# Patient Record
Sex: FEMALE | Race: WHITE | NOT HISPANIC OR LATINO | Employment: OTHER | URBAN - METROPOLITAN AREA
[De-identification: names, ages, dates, MRNs, and addresses within clinical notes are randomized per-mention and may not be internally consistent; named-entity substitution may affect disease eponyms.]

---

## 2022-06-28 RX ORDER — ALPRAZOLAM 0.5 MG/1
TABLET ORAL
COMMUNITY

## 2022-06-28 RX ORDER — LEVOTHYROXINE SODIUM 0.15 MG/1
TABLET ORAL
COMMUNITY

## 2022-06-28 RX ORDER — LORATADINE 10 MG/1
TABLET ORAL
COMMUNITY

## 2022-10-04 ENCOUNTER — HOSPITAL ENCOUNTER (OUTPATIENT)
Dept: RADIOLOGY | Facility: MEDICAL CENTER | Age: 87
End: 2022-10-04
Payer: MEDICARE

## 2022-10-04 ENCOUNTER — PATIENT OUTREACH (OUTPATIENT)
Dept: ONCOLOGY | Facility: MEDICAL CENTER | Age: 87
End: 2022-10-04
Payer: MEDICARE

## 2022-10-04 NOTE — PROGRESS NOTES
Oncology Nurse Navigator (ONN) Kenia Birch reached out to patient to follow up on new referral.  No answer.  ONN left voice message with my contact information requesting a call back at patient's convenience.    INTERVENTION:  ONN introduction letter &  [facility] Calendar sent to patient today.  Patient enrolled in [facility].

## 2022-10-05 ENCOUNTER — HOSPITAL ENCOUNTER (OUTPATIENT)
Dept: RADIATION ONCOLOGY | Facility: MEDICAL CENTER | Age: 87
End: 2022-10-31
Attending: RADIOLOGY
Payer: MEDICARE

## 2022-10-05 VITALS
OXYGEN SATURATION: 98 % | BODY MASS INDEX: 23.56 KG/M2 | HEIGHT: 64 IN | TEMPERATURE: 97.7 F | HEART RATE: 79 BPM | RESPIRATION RATE: 20 BRPM | WEIGHT: 138 LBS | SYSTOLIC BLOOD PRESSURE: 193 MMHG | DIASTOLIC BLOOD PRESSURE: 98 MMHG

## 2022-10-05 DIAGNOSIS — C14.8 CANCER OF LIP AND ORAL CAVITY (HCC): ICD-10-CM

## 2022-10-05 PROCEDURE — 99214 OFFICE O/P EST MOD 30 MIN: CPT | Mod: 25 | Performed by: RADIOLOGY

## 2022-10-05 PROCEDURE — 99205 OFFICE O/P NEW HI 60 MIN: CPT | Mod: 25 | Performed by: RADIOLOGY

## 2022-10-05 PROCEDURE — 31575 DIAGNOSTIC LARYNGOSCOPY: CPT | Performed by: RADIOLOGY

## 2022-10-05 RX ORDER — LOSARTAN POTASSIUM 25 MG/1
25 TABLET ORAL DAILY
COMMUNITY

## 2022-10-05 RX ORDER — LEVOTHYROXINE SODIUM 0.15 MG/1
150 TABLET ORAL
COMMUNITY

## 2022-10-05 RX ORDER — ALPRAZOLAM 0.5 MG/1
0.5 TABLET ORAL NIGHTLY PRN
COMMUNITY
End: 2022-10-07 | Stop reason: SDUPTHER

## 2022-10-05 ASSESSMENT — PAIN SCALES - GENERAL: PAINLEVEL: 5=MODERATE PAIN

## 2022-10-05 NOTE — CONSULTS
RADIATION ONCOLOGY CONSULT    Patient name:  Jennyfer Jordan    Primary Physician:  No primary care provider on file. MRN: [id number]  CSN: [id number]   Referring physician:  Mario Prasad M.D.  : 1931, 89 y.o.     DATE OF SERVICE:   10/5/2022    IDENTIFICATION:   A 89 y.o. female with  Visit Diagnoses     ICD-10-CM   1. 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 on 10/5/2022  Histopathologic type: Squamous cell carcinoma, NOS  Stage prefix: Initial diagnosis  Histologic grade (G): G2  Histologic grading system: 3 grade system      She is being seen in consultation at the kind request of Mario Morales M.D.     HISTORY OF PRESENT ILLNESS:   Subjective    89-year-old relatively healthy female appearing much younger than stated age.  She presents with an enlarging right submandibular mass.  She was noted to have a mucosal lesion of the right lower lip.  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  Surgical options discussed with patient.  She refused.  She refused chemotherapy.  Has been referred for radiotherapy discussion possibly palliative quad shot therapy.        PAST MEDICAL HISTORY:   Past Medical History:   Diagnosis Date    Anxiety     Basal cell carcinoma (BCC)     L eyelid    Cataract     Graves disease     [place] (hard of hearing)     Hypertension     Thyroid disease        PAST SURGICAL HISTORY:  Past Surgical History:   Procedure Laterality Date    THYROIDECTOMY         CURRENT MEDICATIONS:  Current Outpatient Medications   Medication Sig Dispense Refill    losartan (COZAAR) 25 MG Tab Take 25 mg by mouth every day.      levothyroxine (SYNTHROID) 150 MCG Tab Take 150 mcg by mouth every morning on an empty stomach.      ALPRAZolam (XANAX) 0.5 MG Tab Take 0.5 mg by  "mouth at bedtime as needed for Sleep.       No current facility-administered medications for this encounter.       ALLERGIES:  Asa [aspirin], Erythromycin, Latex, and Penicillins    FAMILY HISTORY:    family history includes Cancer in her brother.    SOCIAL HISTORY:     reports that she has never smoked. She has never used smokeless tobacco. She reports current alcohol use of about 0.6 oz per week. She reports current drug use. Drug: Oral.  Patient currently resides in [place] with her son and DIL. She is retired.    REVIEW OF SYSTEMS:    A complete review of system taken. Pertinent items in HPI.  All others negative.    PHYSICAL EXAM:   PERFORMANCE STATUS:  ECOG Performance Review 10/5/2022   ECOG Performance Status Restricted in physically strenuous activity but ambulatory and able to carry out work of a light or sedentary nature, e.g., light house work, office work     Karnofsky Score 10/5/2022   Karnofsky Score 90     BP (!) 193/98   Pulse 79   Temp 36.5 °C (97.7 °F)   Resp 20   Ht 1.626 m (5' 4\")   Wt 62.6 kg (138 lb)   SpO2 98%   BMI 23.69 kg/m²   Physical Exam  Vitals and nursing note reviewed.   Constitutional:       General: She is not in acute distress.     Appearance: She is well-developed.   HENT:      Head: Normocephalic.      Mouth/Throat:      Mouth: Mucous membranes are moist.      Pharynx: Oropharynx is clear.      Comments: 1 cm firm indurated mass with central shallow ulcer involving the  mucosal surface of the right lower lip  Eyes:      Conjunctiva/sclera: Conjunctivae normal.      Pupils: Pupils are equal, round, and reactive to light.   Cardiovascular:      Rate and Rhythm: Normal rate.   Pulmonary:      Effort: Pulmonary effort is normal.   Musculoskeletal:         General: Normal range of motion.      Cervical back: Normal range of motion.   Lymphadenopathy:      Cervical: Cervical adenopathy (5cm fixed right submandibular mass) present.   Skin:     General: Skin is warm and dry.      " Findings: No erythema.   Neurological:      Mental Status: She is alert and oriented to person, place, and time.   Psychiatric:         Behavior: Behavior normal.         Thought Content: Thought content normal.         Judgment: Judgment normal.           FLEXIBLE FIBEROPTIC EXAM:  Normal exam      LABORATORY DATA:   Lab Results   Component Value Date/Time    RBC 4.06 01/15/2021 08:57 AM    HEMOGLOBIN 13.4 01/15/2021 08:57 AM    HEMATOCRIT 39.6 01/15/2021 08:57 AM    MCV 97.6 01/15/2021 08:57 AM    MCH 33.1 01/15/2021 08:57 AM    MCHC 33.9 01/15/2021 08:57 AM    RDW 13.3 01/15/2021 08:57 AM    PLATELETCT 276 01/15/2021 08:57 AM    MPV 7 (L) 01/15/2021 08:57 AM    NEUTSPOLYS 60 01/15/2021 08:57 AM    LYMPHOCYTES 26 01/15/2021 08:57 AM    MONOCYTES 9 01/15/2021 08:57 AM    EOSINOPHILS 5 01/15/2021 08:57 AM    BASOPHILS 1 01/15/2021 08:57 AM      No results found for: SODIUM, POTASSIUM, CHLORIDE, CO2, GLUCOSE, BUN, CREATININE, BUNCREATRAT, GLOMRATE        RADIOLOGY DATA:  DX-CHEST-LIMITED (1 VIEW)    Result Date: 9/13/2022  NEGATIVE EXAM.    CT Soft Tissue Neck with Contrast    Result Date: 9/13/2022  Lobular inflamed enhancing structures present in submandibular region appear to be lymph nodes but could be associated with a mass as well, apparently separate from adjacent submandibular gland..  Findings have progressed somewhat since previous scan and may.      IMPRESSION:    A 89 y.o. with  Visit Diagnoses     ICD-10-CM   1. 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 on 10/5/2022  Histopathologic type: Squamous cell carcinoma, NOS  Stage prefix: Initial diagnosis  Histologic grade (G): G2  Histologic grading system: 3 grade system        RECOMMENDATIONS:   Local regionally advanced lip cancer in a surprisingly healthy 89-year-old.   Recommended PET/CT for staging.  If there is no evidence of metastatic disease, I strongly urged them to consider definitive surgical resection followed by possible adjuvant radiotherapy for curative attempt.  Surgery can be performed at [facility] and I will be happy to make the referral.  From my understanding this is also been recommended by Dr. Prasad.    If on the other hand there is evidence of metastatic disease then palliative therapy would be reasonable choice.    She appears to be receptive to this idea.  PET/CT has been scheduled for tomorrow with a follow-up day after to review findings and make additional plans.    Thank you the opportunity to participate in her care.  If there are any questions or comments please do not hesitate calling.    Mathew BABIN M.D.  Electronically signed by: Mathew BABIN M.D., 10/5/2022 5:25 PM  [phone]      Orders Placed This Encounter    [id number] BASE TO MID-THIGH    Referral to Oncology Psychosocial Screening for Distress    REFERRAL TO ONCOLOGY NURSE NAVIGATOR    losartan (COZAAR) 25 MG Tab    levothyroxine (SYNTHROID) 150 MCG Tab    ALPRAZolam (XANAX) 0.5 MG Tab

## 2022-10-05 NOTE — PROGRESS NOTES
"Patient was seen today in clinic with Dr. Sanders for consult.  Vitals signs and weight were obtained and pain assessment was completed.  Allergies and medications were reviewed with the patient.       Vitals/Pain:  Vitals:    10/05/22 0937   BP: (!) 193/98   Pulse: 79   Resp: 20   Temp: 36.5 °C (97.7 °F)   SpO2: 98%   Weight: 62.6 kg (138 lb)   Height: 1.626 m (5' 4\")   Pain Score: 5=Moderate Pain        Allergies:   Asa [aspirin], Erythromycin, Latex, and Penicillins    Current Medications:  Current Outpatient Medications   Medication Sig Dispense Refill    losartan (COZAAR) 25 MG Tab Take 25 mg by mouth every day.      levothyroxine (SYNTHROID) 150 MCG Tab Take 150 mcg by mouth every morning on an empty stomach.      ALPRAZolam (XANAX) 0.5 MG Tab Take 0.5 mg by mouth at bedtime as needed for Sleep.       No current facility-administered medications for this encounter.         PCP:  No primary care provider on file.        Carol Ramos R.N.   "

## 2022-10-06 ENCOUNTER — HOSPITAL ENCOUNTER (OUTPATIENT)
Dept: RADIOLOGY | Facility: MEDICAL CENTER | Age: 87
End: 2022-10-06
Attending: RADIOLOGY
Payer: MEDICARE

## 2022-10-06 DIAGNOSIS — C14.8 CANCER OF LIP AND ORAL CAVITY (HCC): ICD-10-CM

## 2022-10-06 PROCEDURE — A9552 F18 FDG: HCPCS

## 2022-10-06 NOTE — PROCEDURES
DATE OF SERVICE: 10/5/2022         FIBEROPTIC NASO-PHARYNGOSCOPY NOTE      Flexible fiberoptic exam was performed after anesthesia of left nostril and oropharynx.    Nasopharynx:       R. Eustachian canal opening, torus, fossa of Rosenmuller appear normal  L. Eustachian canal opening, torus, fossa of Rosenmuller appear normal      Oropharynx:        Base of tongue normal appearing.  Vallecula normal appearing.  Epiglottis normal appearing.  PE folds normal appearing.    Larynx:      R. AE fold, false vocal fold, arytenoid appear normal  L. AE fold, false vocal fold, arytenoid appear normal  R. Cord mobile  L. Cord mobile   R. Piriform sinus appears normal  L. Piriform sinus appears normal      Mathew BABIN M.D.  Electronically signed by: Mathew BABIN M.D., 10/5/2022 5:25 PM  [phone]

## 2022-10-07 ENCOUNTER — PATIENT OUTREACH (OUTPATIENT)
Dept: ONCOLOGY | Facility: MEDICAL CENTER | Age: 87
End: 2022-10-07
Payer: MEDICARE

## 2022-10-07 VITALS
WEIGHT: 139.99 LBS | SYSTOLIC BLOOD PRESSURE: 195 MMHG | HEART RATE: 71 BPM | OXYGEN SATURATION: 98 % | DIASTOLIC BLOOD PRESSURE: 86 MMHG | BODY MASS INDEX: 24.03 KG/M2

## 2022-10-07 DIAGNOSIS — C14.8 CANCER OF LIP AND ORAL CAVITY (HCC): ICD-10-CM

## 2022-10-07 DIAGNOSIS — F51.01 PRIMARY INSOMNIA: ICD-10-CM

## 2022-10-07 PROCEDURE — 99213 OFFICE O/P EST LOW 20 MIN: CPT | Performed by: RADIOLOGY

## 2022-10-07 PROCEDURE — 99212 OFFICE O/P EST SF 10 MIN: CPT | Performed by: RADIOLOGY

## 2022-10-07 RX ORDER — ALPRAZOLAM 0.5 MG/1
0.5 TABLET ORAL NIGHTLY PRN
Qty: 30 TABLET | Refills: 0 | Status: SHIPPED | OUTPATIENT
Start: 2022-10-07 | End: 2022-11-06

## 2022-10-07 ASSESSMENT — PAIN SCALES - GENERAL: PAINLEVEL: NO PAIN

## 2022-10-07 NOTE — PROGRESS NOTES
RADIATION ONCOLOGY FOLLOW-UP    Patient name:  Jennyfer Jordan    Primary Physician:  Pcp Pt States None MRN: [id number]  [facility]: [id number]   Referring physician:  Mario Prasad M.D.  : 1931, 89 y.o.     DATE OF SERVICE:   10/7/2022    IDENTIFICATION:   A 89 y.o. 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 on 10/5/2022  Histopathologic type: Squamous cell carcinoma, NOS  Stage prefix: Initial diagnosis  Histologic grade (G): G2  Histologic grading system: 3 grade system        HISTORY OF PRESENT ILLNESS:   Subjective    89-year-old relatively healthy female appearing much younger than stated age.  She presents with an enlarging right submandibular mass.  She was noted to have a mucosal lesion of the right lower lip.  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  Surgical options discussed with patient.  She refused.  She refused chemotherapy.  Has been referred for radiotherapy discussion possibly palliative quad shot therapy.        INTERVAL HISTORY:  10/7/2022.  Follow-up visit after staging PET CT scan.  PET/CT shows disease limited to the right lip and right neck level 1 and 2.      CURRENT MEDICATIONS:  Current Outpatient Medications   Medication Sig Dispense Refill    losartan (COZAAR) 25 MG Tab Take 25 mg by mouth every day.      levothyroxine (SYNTHROID) 150 MCG Tab Take 150 mcg by mouth every morning on an empty stomach.      ALPRAZolam (XANAX) 0.5 MG Tab Take 0.5 mg by mouth at bedtime as needed for Sleep.       No current facility-administered medications for this encounter.       ALLERGIES:  Asa [aspirin], Erythromycin, Latex, and Penicillins    REVIEW OF SYSTEMS:    A complete review of system taken. Pertinent items in HPI.  All other negative.    PHYSICAL EXAM:   PERFORMANCE STATUS:  Karnofsky Score  "10/5/2022   Karnofsky Score 90     Vitals 10/5/2022   SYSTOLIC 193   DIASTOLIC 98   PULSE 79   TEMPERATURE 97.7   RESPIRATIONS 20   WEIGHT 138   HEIGHT 5' 4\"   BMI 23.69 kg/m2   SPO2 98       Physical Exam  Vitals and nursing note reviewed.   Constitutional:       Appearance: She is well-developed.   HENT:      Head: Normocephalic.   Skin:     General: Skin is warm and dry.      Findings: No erythema.   Neurological:      Mental Status: She is alert and oriented to person, place, and time.   Psychiatric:         Behavior: Behavior normal.         Thought Content: Thought content normal.         Judgment: Judgment normal.         FIBEROPTIC EXAM:  Not performed    LABORATORY DATA:   Lab Results   Component Value Date/Time    RBC 4.06 01/15/2021 08:57 AM    HEMOGLOBIN 13.4 01/15/2021 08:57 AM    HEMATOCRIT 39.6 01/15/2021 08:57 AM    MCV 97.6 01/15/2021 08:57 AM    MCH 33.1 01/15/2021 08:57 AM    MCHC 33.9 01/15/2021 08:57 AM    RDW 13.3 01/15/2021 08:57 AM    PLATELETCT 276 01/15/2021 08:57 AM    MPV 7 (L) 01/15/2021 08:57 AM    NEUTSPOLYS 60 01/15/2021 08:57 AM    LYMPHOCYTES 26 01/15/2021 08:57 AM    MONOCYTES 9 01/15/2021 08:57 AM    EOSINOPHILS 5 01/15/2021 08:57 AM    BASOPHILS 1 01/15/2021 08:57 AM      No results found for: SODIUM, POTASSIUM, CHLORIDE, CO2, GLUCOSE, BUN, CREATININE, BUNCREATRAT, GLOMRATE      RADIOLOGY DATA:  DX-CHEST-LIMITED (1 VIEW)    Result Date: 9/13/2022  NEGATIVE EXAM.    [id number] BASE TO MID-THIGH    Result Date: 10/6/2022  1.  Focal hypermetabolic lesion in the paramedian RIGHT lower lip consistent with known squamous cell carcinoma. 2.  Metastatic involvement of multiple RIGHT submandibular lymph nodes. 3.  Probable metastatic involvement of normal-sized RIGHT lower cervical lymph node (level 3). 4.  Potential involvement of LEFT submandibular lymph node, without CT correlate. 5.  No evidence for metastatic disease in the chest, abdomen or pelvis.    CT Soft Tissue Neck with " Contrast    Result Date: 9/13/2022  Lobular inflamed enhancing structures present in submandibular region appear to be lymph nodes but could be associated with a mass as well, apparently separate from adjacent submandibular gland..  Findings have progressed somewhat since previous scan and may.      IMPRESSION:    A 89 y.o. 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 on 10/5/2022  Histopathologic type: Squamous cell carcinoma, NOS  Stage prefix: Initial diagnosis  Histologic grade (G): G2  Histologic grading system: 3 grade system        RECOMMENDATIONS:   Reviewed imaging with patient and her son.  She has local regional disease only and no evidence of distant metastatic disease.  She does have an appointment with Dr. Del Valle at [facility] for surgical discussion on October 20.  I strongly encouraged her if she is a candidate for surgery to have surgery followed by adjuvant radiotherapy.    She will return here for follow-up November 22, assuming surgery will be performed the first week of November.      Thank you for the opportunity to participate in her care.  If any questions or comments, please do not hesitate in calling.      No orders of the defined types were placed in this encounter.

## 2022-10-07 NOTE — PROGRESS NOTES
ALVIN mailed introduction letter and contact information.  Patient has a pet on 10/07 then a follow up on the same day with Dr. Sanders.

## 2022-10-10 ENCOUNTER — PATIENT OUTREACH (OUTPATIENT)
Dept: ONCOLOGY | Facility: MEDICAL CENTER | Age: 87
End: 2022-10-10
Payer: MEDICARE

## 2022-10-14 ENCOUNTER — PATIENT OUTREACH (OUTPATIENT)
Dept: ONCOLOGY | Facility: MEDICAL CENTER | Age: 87
End: 2022-10-14
Payer: MEDICARE

## 2022-10-14 NOTE — PROGRESS NOTES
Oncology Nurse Navigation Assessment:  Oncology Nurse Navigator (ONN) Kenia Birch received a call back from patient's son, Eric.  ALVIN Birch introduced myself, my role and the resources at [facility]. ALVIN explained I am here to provide support, education and guidance throughout his Mom's healthcare journey and to assist with any barriers to care.  Patient 's son shared his understanding of the diagnosis and plan of care for his Mom.  He shared they have an appointment 10/20/22 at [facility] with Dr. Ever Del Valle, an ENT surgeon.  He shared they were referred by Dr. Sanders & their [place] ENT physician.  ONN reviewed the patient's plan of care based on physician notes and scheduled appointments.  Patient verbalized understanding of information provided.  Patient's support team is her son & daughter-in-law.  They live together here in [place]. Eric's current questions have been answered & he agrees to receive my introduction letter & [facility] calendar of Support Services via [employer] & share information with his Mom.  ALVIN Birch encouraged call back with any questions or concerns.  Eric shared they will call with an update after the [facility] consult.      BARRIERS ASSESSMENT:  TRANSPORTATION:  No barriers. Son & daughter-in-law drive patient to appointments.  EMPLOYMENT:   Retired.  FINANCIAL:  No barriers stated.  INSURANCE:  Medicare A/B & AARP.  SUPPORT SYSTEM:  Son & daughter-in-law.  PSYCHOLOGICAL:  Unable to speak with patient today, will complete distress assessment when patient is available.  COMMUNICATION:   No barriers noted.   FAMILY CARE:   Patient is not a caregiver.  SELF CARE:  Independent, lives with son & daughter-in-law, does not drive.         INTERVENTION:  ALVIN introduction letter & [facility] Cancer Support Services Calendar sent to patient today.

## 2022-11-03 ENCOUNTER — PATIENT OUTREACH (OUTPATIENT)
Dept: ONCOLOGY | Facility: MEDICAL CENTER | Age: 87
End: 2022-11-03
Payer: MEDICARE

## 2022-11-03 NOTE — PROGRESS NOTES
"On November 3rd, 2022, Oncology Social Worker Flora Uriarte contacted pt. via telephone.  OSW Chapito explained her role and reason for her call to pt.  Pt. shared she has decided not to have surgery in [place].  Pt. shared she has an appointment with Dr. Sanders on November 11th, 2022 at 9:30 am.  Pt. shared her daughter called to get this appointment scheduled sooner since she's not having surgery.  Pt. shared she decided not to have surgery because they told her \"a side of my face will be paralyzed.\"      Pt. shared her son is going to take time off when she has treatment to take care of her.  Pt. asked about her son getting paid for taking care of her.  ALIZE Uriarte informed pt. she would need to contact the social security administration to find out if medicare will pay for caregiving.      ALIZE Uriarte informed pt. she would contact Dr. Sanders's Medical Assistant at pt.'s request to find out if they can get her in to see Dr. Sanders sooner.      ALIZE Uriarte contacted Dr. Sanders's MA Katiana Billy to relay pt.'s message.    "

## 2022-11-11 ENCOUNTER — HOSPITAL ENCOUNTER (OUTPATIENT)
Dept: RADIATION ONCOLOGY | Facility: MEDICAL CENTER | Age: 87
End: 2022-11-30
Attending: RADIOLOGY
Payer: MEDICARE

## 2022-11-11 ENCOUNTER — PHARMACY VISIT (OUTPATIENT)
Dept: PHARMACY | Facility: MEDICAL CENTER | Age: 87
End: 2022-11-11
Payer: COMMERCIAL

## 2022-11-11 ENCOUNTER — HOSPITAL ENCOUNTER (OUTPATIENT)
Dept: RADIATION ONCOLOGY | Facility: MEDICAL CENTER | Age: 87
End: 2022-11-11

## 2022-11-11 VITALS
TEMPERATURE: 97.6 F | OXYGEN SATURATION: 98 % | BODY MASS INDEX: 23.95 KG/M2 | HEART RATE: 77 BPM | DIASTOLIC BLOOD PRESSURE: 96 MMHG | WEIGHT: 139.55 LBS | SYSTOLIC BLOOD PRESSURE: 172 MMHG

## 2022-11-11 DIAGNOSIS — C14.8 CANCER OF LIP AND ORAL CAVITY (HCC): ICD-10-CM

## 2022-11-11 PROCEDURE — 77290 THER RAD SIMULAJ FIELD CPLX: CPT | Performed by: RADIOLOGY

## 2022-11-11 PROCEDURE — 99212 OFFICE O/P EST SF 10 MIN: CPT | Mod: 25 | Performed by: RADIOLOGY

## 2022-11-11 PROCEDURE — RXMED WILLOW AMBULATORY MEDICATION CHARGE: Performed by: RADIOLOGY

## 2022-11-11 PROCEDURE — 77263 THER RADIOLOGY TX PLNG CPLX: CPT | Performed by: RADIOLOGY

## 2022-11-11 PROCEDURE — 77334 RADIATION TREATMENT AID(S): CPT | Mod: 26 | Performed by: RADIOLOGY

## 2022-11-11 PROCEDURE — 77334 RADIATION TREATMENT AID(S): CPT | Performed by: RADIOLOGY

## 2022-11-11 PROCEDURE — 99214 OFFICE O/P EST MOD 30 MIN: CPT | Mod: 25 | Performed by: RADIOLOGY

## 2022-11-11 RX ORDER — HYDROMORPHONE HYDROCHLORIDE 2 MG/1
2 TABLET ORAL EVERY 4 HOURS PRN
Qty: 42 TABLET | Refills: 0 | Status: SHIPPED | OUTPATIENT
Start: 2022-11-11 | End: 2022-11-18

## 2022-11-11 RX ORDER — HYDROMORPHONE HYDROCHLORIDE 2 MG/1
2 TABLET ORAL EVERY 4 HOURS PRN
Qty: 42 TABLET | Refills: 0 | Status: SHIPPED | OUTPATIENT
Start: 2022-11-11 | End: 2022-11-11

## 2022-11-11 ASSESSMENT — PAIN SCALES - GENERAL: PAINLEVEL: NO PAIN

## 2022-11-11 NOTE — PROGRESS NOTES
Patient was seen today in clinic with Dr. Sanders for follow up.  Vitals signs and weight were obtained and pain assessment was completed.  Allergies and medications were reviewed with the patient.  Toxicities of treatment assessed.     Vitals/Pain:  Vitals:    11/11/22 0928   BP: (!) 172/96   BP Location: Right arm   Patient Position: Sitting   BP Cuff Size: Adult   Pulse: 77   Temp: 36.4 °C (97.6 °F)   TempSrc: Temporal   SpO2: 98%   Weight: 63.3 kg (139 lb 8.8 oz)   Pain Score: No pain        Allergies:   Asa [aspirin], Erythromycin, Latex, and Penicillins    Current Medications:  Current Outpatient Medications   Medication Sig Dispense Refill    losartan (COZAAR) 25 MG Tab Take 25 mg by mouth every day.      levothyroxine (SYNTHROID) 150 MCG Tab Take 150 mcg by mouth every morning on an empty stomach.       No current facility-administered medications for this encounter.           Katiana Billy, Med Ass't

## 2022-11-11 NOTE — ADDENDUM NOTE
Encounter addended by: Mathew BABIN M.D. on: 11/11/2022 10:46 AM   Actions taken: SmartForm saved, Clinical Note Signed

## 2022-11-11 NOTE — RADIATION PLANNING NOTES
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 on 10/5/2022  Histopathologic type: Squamous cell carcinoma, NOS  Stage prefix: Initial diagnosis  Histologic grade (G): G2  Histologic grading system: 3 grade system       DATE OF SERVICE: 11/11/2022    TYPE OF SIMULATION: Head & Neck    GOAL OF TREATMENT:   [] Curative  [x] Palliative  [] Oligometastatic    CONTRAST:    [x] IV Contrast*  [] Oral Contrast               POSITION:    [x]  Supine  [] Prone     COMPLEX:  [] Complex Blocking   [x]Arcs  [] Custom Blocks  [] >3 Sites    PROCEDURE: Patient place in supine position on CT table with head in neutral position. Dentures removed. Shoulder pulls used to stabilize shoulders. Patient head and shoulders were immobilized with a thermoplastic mask.   films evaluated to ensure proper patient position.  CT obtained through entire volume of interest. Exam pushed to treatment planning system for contouring and planning.    I have personally reviewed the relevant data, performed the target localization, and determined all relevant factors for this patient’s simulation.    *Omnipaque 80 -100cc IVP in conjunction with 500cc NS

## 2022-11-11 NOTE — PROGRESS NOTES
RADIATION ONCOLOGY FOLLOW-UP    Patient name:  Jennyfer Jordan    Primary Physician:  Pcp Pt States None MRN: [id number]  CSN: [id number]   Referring physician:  No ref. provider found  : 1931, 89 y.o.     DATE OF SERVICE:   2022    IDENTIFICATION:   A 89 y.o. 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 on 10/5/2022  Histopathologic type: Squamous cell carcinoma, NOS  Stage prefix: Initial diagnosis  Histologic grade (G): G2  Histologic grading system: 3 grade system      RADIATION SUMMARY:  [facility] Treatment Information           No data to display                 HISTORY OF PRESENT ILLNESS:   Subjective    89-year-old relatively healthy female appearing much younger than stated age.  She presents with an enlarging right submandibular mass.  She was noted to have a mucosal lesion of the right lower lip.  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  Surgical options discussed with patient.  She refused.  She refused chemotherapy.  Has been referred for radiotherapy discussion possibly palliative quad shot therapy.    10/7/2022.  Follow-up visit after staging PET CT scan.  PET/CT shows disease limited to the right lip and right neck level 1 and 2.        INTERVAL HISTORY:  2022.  Scheduled follow-up visit after second opinion at [facility].  She was felt to be a surgical candidate.  However, surgery would be extensive because of now facial nerve involvement causing hypoesthesia along the marginal mandibular branch.  After discussing the potential adverse effects of surgical resection patient declined.  She is here today to pursue radiotherapy.  She still not interested in systemic therapy.    Currently she is having increased pain in the right mandibular region.  She is also experiencing hypoesthesia of the  "overlying skin.  She is got mild trismus that is developed.  Still able to eat.  She is using ibuprofen to control the pain.  I have noticed her creatinine level has bumped up a little bit.      CURRENT MEDICATIONS:  Current Outpatient Medications   Medication Sig Dispense Refill    HYDROmorphone (DILAUDID) 2 MG Tab Take 1 Tablet by mouth every four hours as needed for Severe Pain for up to 7 days. 42 Tablet 0    losartan (COZAAR) 25 MG Tab Take 25 mg by mouth every day.      levothyroxine (SYNTHROID) 150 MCG Tab Take 150 mcg by mouth every morning on an empty stomach.       No current facility-administered medications for this encounter.       ALLERGIES:  Asa [aspirin], Erythromycin, Latex, and Penicillins    REVIEW OF SYSTEMS:    A complete review of system taken. Pertinent items in HPI.  All other negative.    PHYSICAL EXAM:   PERFORMANCE STATUS:      10/5/2022     9:51 AM   Karnofsky Score   Karnofsky Score 90         11/11/2022     9:28 AM 10/7/2022     3:26 PM 10/5/2022     9:37 AM   Vitals   SYSTOLIC 172 195 193   DIASTOLIC 96 86 98   Pulse 77 71 79   Temperature 36.4 °C (97.6 °F)  36.5 °C (97.7 °F)   Respiration   20   Weight 139.55 139.99 138   Height   1.626 m (5' 4\")   BMI 23.95 kg/m2 24.03 kg/m2 23.69 kg/m2   Pulse Oximetry 98 % 98 % 98 %       Physical Exam  Vitals and nursing note reviewed.   Constitutional:       Appearance: She is well-developed.   HENT:      Head: Normocephalic.      Mouth/Throat:      Comments: Ulcer involving the right lower lip mucosal surface  Lymphadenopathy:      Cervical: Cervical adenopathy (8 x 4 cm firm fixed right submandibular mass) present.   Skin:     General: Skin is warm and dry.      Findings: No erythema.   Neurological:      Mental Status: She is alert and oriented to person, place, and time.      Sensory: Sensory deficit (right cheek along mandible) present.   Psychiatric:         Behavior: Behavior normal.         Thought Content: Thought content normal.         " Judgment: Judgment normal.         FIBEROPTIC EXAM:  Not performed    LABORATORY DATA:   Lab Results   Component Value Date/Time    RBC 4.06 01/15/2021 08:57 AM    HEMOGLOBIN 13.4 01/15/2021 08:57 AM    HEMATOCRIT 39.6 01/15/2021 08:57 AM    MCV 97.6 01/15/2021 08:57 AM    MCH 33.1 01/15/2021 08:57 AM    MCHC 33.9 01/15/2021 08:57 AM    RDW 13.3 01/15/2021 08:57 AM    PLATELETCT 276 01/15/2021 08:57 AM    MPV 7 (L) 01/15/2021 08:57 AM    NEUTSPOLYS 60 01/15/2021 08:57 AM    LYMPHOCYTES 26 01/15/2021 08:57 AM    MONOCYTES 9 01/15/2021 08:57 AM    EOSINOPHILS 5 01/15/2021 08:57 AM    BASOPHILS 1 01/15/2021 08:57 AM      No results found for: SODIUM, POTASSIUM, CHLORIDE, CO2, GLUCOSE, BUN, CREATININE, BUNCREATRAT, GLOMRATE      RADIOLOGY DATA:  DX-CHEST-LIMITED (1 VIEW)    Result Date: 9/13/2022  NEGATIVE EXAM.    DW-LIVIR-WCSQX BASE TO MID-THIGH    Result Date: 10/6/2022  1.  Focal hypermetabolic lesion in the paramedian RIGHT lower lip consistent with known squamous cell carcinoma. 2.  Metastatic involvement of multiple RIGHT submandibular lymph nodes. 3.  Probable metastatic involvement of normal-sized RIGHT lower cervical lymph node (level 3). 4.  Potential involvement of LEFT submandibular lymph node, without CT correlate. 5.  No evidence for metastatic disease in the chest, abdomen or pelvis.    CT Soft Tissue Neck with Contrast    Result Date: 9/13/2022  Lobular inflamed enhancing structures present in submandibular region appear to be lymph nodes but could be associated with a mass as well, apparently separate from adjacent submandibular gland..  Findings have progressed somewhat since previous scan and may.      IMPRESSION:    A 89 y.o. 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 on 10/5/2022  Histopathologic type: Squamous cell carcinoma, NOS  Stage prefix: Initial  diagnosis  Histologic grade (G): G2  Histologic grading system: 3 grade system      CANCER STATUS:  Pending start of therapy    RECOMMENDATIONS:   Patient has locally advanced patient has regionally advanced right lower lip squamous cell cancer.  Considering her age she is not a candidate for chemoradiotherapy.  She is declined surgical intervention considering the significant morbidity that she may face postoperatively.  In addition given the potential facial nerve involvement she is going to need adjuvant radiotherapy.  So at this point she has decided to proceed with radiotherapy alone.    We reviewed standard fractionation of 7000 cGy versus quad shot therapy of 3-4 cycles which will provide equivalent BED results.  Quad shot would not involve 4 treatments given over 2 days with a 2-week break between cycles.  The quad shot will be an easier schedule for her with less acute effects.  We reviewed the technical aspects benefits risks associate with both options.  She is agreed to proceed with quad shot therapy.  She will undergo simulation today with treatment anticipated to start November 16.    Of asked her to discontinue use of ibuprofen and given her prescription for hydromorphone.    Thank you for the opportunity to participate in her care.  If any questions or comments, please do not hesitate in calling.      Orders Placed This Encounter    DISCONTD: HYDROmorphone (DILAUDID) 2 MG Tab    HYDROmorphone (DILAUDID) 2 MG Tab    Consent for Opiate Prescription

## 2022-11-11 NOTE — RADIATION PLANNING NOTES
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 on 10/5/2022  Histopathologic type: Squamous cell carcinoma, NOS  Stage prefix: Initial diagnosis  Histologic grade (G): G2  Histologic grading system: 3 grade system         IMAGING REVIEWED:  [] CT     [] MRI     [x] PET/CT     [] BONE SCAN     [] MAMMO     [] OTHER      TREATMENT INTENT:   [] CURATIVE     [] MAINTENANCE     [x]  PALLIATIVE      []  SUPPORTIVE     []  PROPHYLACTIC     [] BENIGN     []  CONSOLIDATIVE      [] DEFINITIVE   []  OLOGIMETASTATIC      LINE OF TREATMENT:  [] ADJUVANT   [x] DEFINITIVE   [] NEOADJUVANT   [] RE-TREATMENT      TECHNIQUE PLANNED:  [x] IMRT   [] 3D   [] SBRT   [] SRS/SRT   [] HDR   [] ELECTRON       IMRT JUSTIFICATION:  []   An immediately adjacent area has been previously irradiated and abutting portals must be established with high precision.    []  Dose escalation is planned to deliver radiation doses in excess of those commonly utilized for similar tumors with conventional treatment.    []  The target volume is concave or convex, and the critical normal tissues are within or around that convexity or concavity.    [x]  The target volume is in close proximity to critical structures that must be protected.    [x]  The volume of interest must be covered with narrow margins to adequately protect  immediately adjacent structures.      FIELDS & BLOCKING:  [] COMPLEX BLOCKS     []  = 3 TX AREAS     [x]  ARCS     []  CUSTOM SHEILD        []  SIMPLE BLOCK      CHEMOTHERAPY:  []  CONCURRENT     []  INDUCTION     [] SEQUENTIAL     []  <30 DAYS FROM XRT      NOTES:  OAR CONSTRAINTS: (GUIDELINES ONLY NOT ABSOLUTE)  Target Prescribed Coverage   PTV1 95% of PTV1 covered by 100% (cGy) of RX Dose     PTV1 99% of PTV1 covered by 93% (cGy)  of RX Dose       [place] Goal   Any volume (1cc) outside PTV Max Dose < 74Gy   Plan Hot Spot Max Dose < 110%   Cord  Max Dose < 45Gy   Cord + 0.5cm Max Dose < 50Gy   Brainstem Max Dose < 52 Gy   Brainstem + 0.5cm                           (0.3cm w/ Dr. Micah.) Max Dose < 52Gy   Oral Pharynx  Mean Dose < 45Gy    Oral Cavity Mean Dose < 30Gy   R Parotid  Mean Dose < 23Gy    L Parotid  Mean Dose < 23Gy    Cervical Esophagus Mean Dose < 30Gy   Contralateral Submandibular gland (not target)  Max Dose < 39Gy   Optic Chiasm Max Dose < 54Gy   R Optic Nerve Max Dose < 54Gy   L Optic Nerve Max Dose < 54Gy   R Eye Max Dose < 35Gy   L Eye Max Dose < 35Gy   R Lens Max Dose < 10Gy   L Lens Max Dose < 10Gy   Inner Ear Mean Dose < 50Gy    Mandible Goal - 1cc Max Dose < 70Gy   *RTOG 1016, RTOG 0225
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
## 2022-11-15 PROCEDURE — 77300 RADIATION THERAPY DOSE PLAN: CPT | Mod: 26 | Performed by: RADIOLOGY

## 2022-11-15 PROCEDURE — 77300 RADIATION THERAPY DOSE PLAN: CPT | Performed by: RADIOLOGY

## 2022-11-15 PROCEDURE — 77338 DESIGN MLC DEVICE FOR IMRT: CPT | Mod: 26 | Performed by: RADIOLOGY

## 2022-11-15 PROCEDURE — 77301 RADIOTHERAPY DOSE PLAN IMRT: CPT | Performed by: RADIOLOGY

## 2022-11-15 PROCEDURE — 77338 DESIGN MLC DEVICE FOR IMRT: CPT | Performed by: RADIOLOGY

## 2022-11-15 PROCEDURE — 77301 RADIOTHERAPY DOSE PLAN IMRT: CPT | Mod: 26 | Performed by: RADIOLOGY

## 2022-11-16 ENCOUNTER — HOSPITAL ENCOUNTER (OUTPATIENT)
Dept: RADIATION ONCOLOGY | Facility: MEDICAL CENTER | Age: 87
End: 2022-11-16

## 2022-11-16 ENCOUNTER — HOSPITAL ENCOUNTER (OUTPATIENT)
Dept: RADIATION ONCOLOGY | Facility: MEDICAL CENTER | Age: 87
End: 2022-11-16
Payer: MEDICARE

## 2022-11-16 ENCOUNTER — PHARMACY VISIT (OUTPATIENT)
Dept: PHARMACY | Facility: MEDICAL CENTER | Age: 87
End: 2022-11-16
Payer: COMMERCIAL

## 2022-11-16 VITALS
HEART RATE: 77 BPM | WEIGHT: 139 LBS | OXYGEN SATURATION: 97 % | TEMPERATURE: 97.6 F | DIASTOLIC BLOOD PRESSURE: 82 MMHG | BODY MASS INDEX: 23.86 KG/M2 | SYSTOLIC BLOOD PRESSURE: 185 MMHG

## 2022-11-16 DIAGNOSIS — C14.8 CANCER OF LIP AND ORAL CAVITY (HCC): ICD-10-CM

## 2022-11-16 LAB
CHEMOTHERAPY INFUSION START DATE: NORMAL
CHEMOTHERAPY INFUSION START DATE: NORMAL
CHEMOTHERAPY RECORDS: 1500
CHEMOTHERAPY RECORDS: 1500
CHEMOTHERAPY RECORDS: 3.75
CHEMOTHERAPY RECORDS: 3.75
CHEMOTHERAPY RECORDS: NORMAL
CHEMOTHERAPY RX CANCER: NORMAL
CHEMOTHERAPY RX CANCER: NORMAL
DATE 1ST CHEMO CANCER: NORMAL
DATE 1ST CHEMO CANCER: NORMAL
RAD ONC ARIA COURSE LAST TREATMENT DATE: NORMAL
RAD ONC ARIA COURSE LAST TREATMENT DATE: NORMAL
RAD ONC ARIA COURSE TREATMENT ELAPSED DAYS: NORMAL
RAD ONC ARIA COURSE TREATMENT ELAPSED DAYS: NORMAL
RAD ONC ARIA REFERENCE POINT DOSAGE GIVEN TO DATE: 3.75
RAD ONC ARIA REFERENCE POINT DOSAGE GIVEN TO DATE: 3.89
RAD ONC ARIA REFERENCE POINT DOSAGE GIVEN TO DATE: 7.5
RAD ONC ARIA REFERENCE POINT DOSAGE GIVEN TO DATE: 7.78
RAD ONC ARIA REFERENCE POINT ID: NORMAL
RAD ONC ARIA REFERENCE POINT SESSION DOSAGE GIVEN: 3.75
RAD ONC ARIA REFERENCE POINT SESSION DOSAGE GIVEN: 3.75
RAD ONC ARIA REFERENCE POINT SESSION DOSAGE GIVEN: 3.89
RAD ONC ARIA REFERENCE POINT SESSION DOSAGE GIVEN: 3.89

## 2022-11-16 PROCEDURE — RXOTC WILLOW AMBULATORY OTC CHARGE: Performed by: PHARMACIST

## 2022-11-16 PROCEDURE — RXMED WILLOW AMBULATORY MEDICATION CHARGE: Performed by: RADIOLOGY

## 2022-11-16 PROCEDURE — 77014 PR CT GUIDANCE PLACEMENT RAD THERAPY FIELDS: CPT | Mod: 26,XE | Performed by: RADIOLOGY

## 2022-11-16 PROCEDURE — 77386 HCHG IMRT DELIVERY COMPLEX: CPT | Mod: XE | Performed by: RADIOLOGY

## 2022-11-16 PROCEDURE — 77280 THER RAD SIMULAJ FIELD SMPL: CPT | Performed by: RADIOLOGY

## 2022-11-16 RX ORDER — ONDANSETRON 8 MG/1
8 TABLET, ORALLY DISINTEGRATING ORAL EVERY 12 HOURS PRN
Qty: 10 TABLET | Refills: 0 | Status: SHIPPED | OUTPATIENT
Start: 2022-11-16 | End: 2022-11-28 | Stop reason: SDUPTHER

## 2022-11-16 ASSESSMENT — PAIN SCALES - GENERAL: PAINLEVEL: NO PAIN

## 2022-11-16 NOTE — ON TREATMENT VISIT
"  ON TREATMENT  NOTE  RADIATION ONCOLOGY DEPARTMENT    Patient name:  Jennyfer Jordan    Primary Physician:  Pcp Pt States None MRN: [id number]  CSN: [id number]   Referring physician:  No ref. provider found   : 1931, 89 y.o.     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 on 10/5/2022  Histopathologic type: Squamous cell carcinoma, NOS  Stage prefix: Initial diagnosis  Histologic grade (G): G2  Histologic grading system: 3 grade system      TREATMENT SUMMARY:  Course First Treatment Date 2022  Course Last Treatment Date 2022  Radiation Treatments       Active   Plans   Lip_QS#1   Most recent treatment: Dose planned: 375 cGy (fraction 1 of 4 on 2022)   Total: Dose planned: 1,500 cGy   Elapsed Days: 0 @            Historical   No historical radiation treatments to show.                 SUBJECTIVE:  Tolerating well.    VITAL SIGNS:      2022    10:25 AM 2022     9:28 AM 10/7/2022     3:26 PM 10/5/2022     9:37 AM   Vitals   SYSTOLIC 185 172 195 193   DIASTOLIC 82 96 86 98   Pulse 77 77 71 79   Temperature 36.4 °C (97.6 °F) 36.4 °C (97.6 °F)  36.5 °C (97.7 °F)   Respiration    20   Weight 139 139.55 139.99 138   Height    1.626 m (5' 4\")   BMI 23.86 kg/m2 23.95 kg/m2 24.03 kg/m2 23.69 kg/m2   Pulse Oximetry 97 % 98 % 98 % 98 %     KPS: 70, Cares for self; unable to carry on normal activity or to do active work (ECOG equivalent 1)  Encounter Vitals  Temperature: 36.4 °C (97.6 °F)  Temp src: Temporal  Blood Pressure : (!) 185/82  Pulse: 77  Pulse Oximetry: 97 %  Weight: 63 kg (139 lb)  Pain Score: No pain      2022    10:25 AM 2022     9:28 AM 10/7/2022     3:26 PM 10/5/2022     9:37 AM   Pain Assessment   Pain Score NO PAIN NO PAIN NO PAIN 5=MODERATE P   Pain Loc    NECK      "     PHYSICAL EXAM:  Physical Exam  Vitals and nursing note reviewed.   Constitutional:       Appearance: She is well-developed.   HENT:      Head: Normocephalic.   Skin:     General: Skin is warm and dry.      Findings: No erythema.   Neurological:      Mental Status: She is alert and oriented to person, place, and time.   Psychiatric:         Behavior: Behavior normal.         Thought Content: Thought content normal.         Judgment: Judgment normal.            11/16/2022    10:28 AM   Toxicity Assessment   Toxicity Assessment Head/Neck   Fatigue (lethargy, malaise, asthenia) None   Radiation Dermatitis None   Rash/desquamation None   Constipation None   Dehydration None   Mouth Dryness Moderate   RT Dysphagia-Pharyngeal None   Mucositis None   Salivary Gland Changes None   Stomatitis/Pharyngitis None   Taste Disturbance (dysgeusia) Normal   RT - Pain due to RT None   Cough Absent   Voice changes/stridor/larynx (hoarseness, loss of voice, laryngitis) Normal       CURRENT MEDICATIONS:    Current Outpatient Medications:     ondansetron (ZOFRAN ODT) 8 MG TABLET DISPERSIBLE, Take 1 Tablet by mouth every 12 hours as needed for Nausea. Dissolve on Tongue, Disp: 10 Tablet, Rfl: 0    HYDROmorphone (DILAUDID) 2 MG Tab, Take 1 Tablet by mouth every four hours as needed for Severe Pain for up to 7 days., Disp: 42 Tablet, Rfl: 0    losartan (COZAAR) 25 MG Tab, Take 25 mg by mouth every day., Disp: , Rfl:     levothyroxine (SYNTHROID) 150 MCG Tab, Take 150 mcg by mouth every morning on an empty stomach., Disp: , Rfl:     LABORATORY DATA:   No results found for: SODIUM, POTASSIUM, CHLORIDE, CO2, GLUCOSE, BUN, CREATININE, BUNCREATRAT, GLOMRATE    Lab Results   Component Value Date/Time    RBC 4.06 01/15/2021 08:57 AM    HEMOGLOBIN 13.4 01/15/2021 08:57 AM    HEMATOCRIT 39.6 01/15/2021 08:57 AM    MCV 97.6 01/15/2021 08:57 AM    MCH 33.1 01/15/2021 08:57 AM    MCHC 33.9 01/15/2021 08:57 AM    PLATELETCT 276 01/15/2021 08:57 AM          RADIOLOGY DATA:  KT-NQUHC-WTCUD BASE TO MID-THIGH    Result Date: 10/6/2022  1.  Focal hypermetabolic lesion in the paramedian RIGHT lower lip consistent with known squamous cell carcinoma. 2.  Metastatic involvement of multiple RIGHT submandibular lymph nodes. 3.  Probable metastatic involvement of normal-sized RIGHT lower cervical lymph node (level 3). 4.  Potential involvement of LEFT submandibular lymph node, without CT correlate. 5.  No evidence for metastatic disease in the chest, abdomen or pelvis.      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 on 10/5/2022      PLAN:  No change in treatment plan    Disposition:  Treatment plan and imaging reviewed. Questions answered. Continue therapy outlined.     Mathew BABIN M.D.    Orders Placed This Encounter    ondansetron (ZOFRAN ODT) 8 MG TABLET DISPERSIBLE

## 2022-11-16 NOTE — CT SIMULATION
DATE OF SERVICE: 11/16/2022    Radiation Therapy Episodes       Active Episodes       Radiation Therapy: IMRT (11/17/2022)                   Radiation Treatments         Plan Last Treated On Elapsed Days Fractions Treated Prescribed Fraction Dose (cGy) Prescribed Total Dose (cGy)    Lip_QS#1 11/16/2022 0 @ [id number] 1 of 4 375 1,500                  Reference Point Last Treated On Elapsed Days Most Recent Session Dose (cGy) Total Dose (cGy)    LipQS 11/16/2022 0 @ [id number] 375 375    [facility]#1_CP 11/16/2022 0 @ [id number] 389 389                            First Visit Simple Simulation: Called by [employer] machine to verify treatment parameters including:  treatment site, treatment dose, and treatment setup prior to first treatment. Image derived shifts reviewed in all appropriate plains.  Shifts approved.  Patient treated.    I have personally reviewed the relevant data, performed the target localization, and determined all relevant factors for this patient’s simulation.

## 2022-11-17 ENCOUNTER — HOSPITAL ENCOUNTER (OUTPATIENT)
Dept: RADIATION ONCOLOGY | Facility: MEDICAL CENTER | Age: 87
End: 2022-11-17
Payer: MEDICARE

## 2022-11-17 ENCOUNTER — HOSPITAL ENCOUNTER (OUTPATIENT)
Dept: RADIATION ONCOLOGY | Facility: MEDICAL CENTER | Age: 87
End: 2022-11-17

## 2022-11-17 LAB
CHEMOTHERAPY INFUSION START DATE: NORMAL
CHEMOTHERAPY INFUSION START DATE: NORMAL
CHEMOTHERAPY RECORDS: 1500
CHEMOTHERAPY RECORDS: 1500
CHEMOTHERAPY RECORDS: 3.75
CHEMOTHERAPY RECORDS: 3.75
CHEMOTHERAPY RECORDS: NORMAL
CHEMOTHERAPY RX CANCER: NORMAL
CHEMOTHERAPY RX CANCER: NORMAL
DATE 1ST CHEMO CANCER: NORMAL
DATE 1ST CHEMO CANCER: NORMAL
RAD ONC ARIA COURSE LAST TREATMENT DATE: NORMAL
RAD ONC ARIA COURSE LAST TREATMENT DATE: NORMAL
RAD ONC ARIA COURSE TREATMENT ELAPSED DAYS: NORMAL
RAD ONC ARIA COURSE TREATMENT ELAPSED DAYS: NORMAL
RAD ONC ARIA REFERENCE POINT DOSAGE GIVEN TO DATE: 11.25
RAD ONC ARIA REFERENCE POINT DOSAGE GIVEN TO DATE: 11.67
RAD ONC ARIA REFERENCE POINT DOSAGE GIVEN TO DATE: 15
RAD ONC ARIA REFERENCE POINT DOSAGE GIVEN TO DATE: 15.56
RAD ONC ARIA REFERENCE POINT ID: NORMAL
RAD ONC ARIA REFERENCE POINT SESSION DOSAGE GIVEN: 3.75
RAD ONC ARIA REFERENCE POINT SESSION DOSAGE GIVEN: 3.75
RAD ONC ARIA REFERENCE POINT SESSION DOSAGE GIVEN: 3.89
RAD ONC ARIA REFERENCE POINT SESSION DOSAGE GIVEN: 3.89

## 2022-11-17 PROCEDURE — 77386 HCHG IMRT DELIVERY COMPLEX: CPT | Mod: XE | Performed by: RADIOLOGY

## 2022-11-17 PROCEDURE — 77336 RADIATION PHYSICS CONSULT: CPT | Performed by: RADIOLOGY

## 2022-11-17 PROCEDURE — 77014 PR CT GUIDANCE PLACEMENT RAD THERAPY FIELDS: CPT | Mod: 26,XE | Performed by: RADIOLOGY

## 2022-11-28 ENCOUNTER — PHARMACY VISIT (OUTPATIENT)
Dept: PHARMACY | Facility: MEDICAL CENTER | Age: 87
End: 2022-11-28
Payer: COMMERCIAL

## 2022-11-28 ENCOUNTER — HOSPITAL ENCOUNTER (OUTPATIENT)
Dept: RADIATION ONCOLOGY | Facility: MEDICAL CENTER | Age: 87
End: 2022-11-28

## 2022-11-28 DIAGNOSIS — C14.8 CANCER OF LIP AND ORAL CAVITY (HCC): ICD-10-CM

## 2022-11-28 PROCEDURE — RXOTC WILLOW AMBULATORY OTC CHARGE

## 2022-11-28 PROCEDURE — RXMED WILLOW AMBULATORY MEDICATION CHARGE: Performed by: RADIOLOGY

## 2022-11-28 PROCEDURE — 77290 THER RAD SIMULAJ FIELD CPLX: CPT | Performed by: RADIOLOGY

## 2022-11-28 RX ORDER — LIDOCAINE HYDROCHLORIDE 20 MG/ML
SOLUTION OROPHARYNGEAL
Qty: 200 ML | Refills: 2 | Status: SHIPPED | OUTPATIENT
Start: 2022-11-28

## 2022-11-28 RX ORDER — ONDANSETRON 8 MG/1
8 TABLET, ORALLY DISINTEGRATING ORAL EVERY 12 HOURS PRN
Qty: 20 TABLET | Refills: 0 | Status: SHIPPED | OUTPATIENT
Start: 2022-11-28 | End: 2022-12-08

## 2022-11-28 NOTE — CT SIMULATION
PATIENT NAME Jennyfer Jordan   PRIMARY PHYSICIAN Pcp Pt States None [id number]   REFERRING PHYSICIAN No ref. 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 on 10/5/2022  Histopathologic type: Squamous cell carcinoma, NOS  Stage prefix: Initial diagnosis  Histologic grade (G): G2  Histologic grading system: 3 grade system         Treatment Planning CT Simulation        Order Questions       Question Answer    Is this for a new course of treatment? No    Is this an Addendum? Yes    Implanted Device/Pacemaker No    Planned Start Date 11/30/2022    Treatment Site Lip - Lower    Laterality Right    Treatment Technique IMRT    Treatment Pattern/Frequency BID    Other Orders Weekly Physics Check

## 2022-11-28 NOTE — RADIATION PLANNING NOTES
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 on 10/5/2022  Histopathologic type: Squamous cell carcinoma, NOS  Stage prefix: Initial diagnosis  Histologic grade (G): G2  Histologic grading system: 3 grade system       DATE OF SERVICE: 11/28/2022    TYPE OF SIMULATION: Head & Neck Quad Shot #2    GOAL OF TREATMENT:   [] Curative  [x] Palliative  [] Oligometastatic    CONTRAST:    [] IV Contrast*  [] Oral Contrast               POSITION:    [x]  Supine  [] Prone     COMPLEX:  [] Complex Blocking   [x]Arcs  [] Custom Blocks  [] >3 Sites    PROCEDURE: Patient place in supine position on CT table with head in neutral position. Dentures removed. Shoulder pulls used to stabilize shoulders. Patient head and shoulders were immobilized with a thermoplastic mask.   films evaluated to ensure proper patient position.  CT obtained through entire volume of interest. Exam pushed to treatment planning system for contouring and planning.    I have personally reviewed the relevant data, performed the target localization, and determined all relevant factors for this patient’s simulation.    *Omnipaque 80 -100cc IVP in conjunction with 500cc NS

## 2022-11-29 PROCEDURE — 77301 RADIOTHERAPY DOSE PLAN IMRT: CPT | Mod: 26 | Performed by: RADIOLOGY

## 2022-11-29 PROCEDURE — 77300 RADIATION THERAPY DOSE PLAN: CPT | Performed by: RADIOLOGY

## 2022-11-29 PROCEDURE — 77338 DESIGN MLC DEVICE FOR IMRT: CPT | Mod: 26 | Performed by: RADIOLOGY

## 2022-11-29 PROCEDURE — 77300 RADIATION THERAPY DOSE PLAN: CPT | Mod: 26 | Performed by: RADIOLOGY

## 2022-11-29 PROCEDURE — 77301 RADIOTHERAPY DOSE PLAN IMRT: CPT | Performed by: RADIOLOGY

## 2022-11-29 PROCEDURE — 77338 DESIGN MLC DEVICE FOR IMRT: CPT | Performed by: RADIOLOGY

## 2022-11-30 ENCOUNTER — HOSPITAL ENCOUNTER (OUTPATIENT)
Dept: RADIATION ONCOLOGY | Facility: MEDICAL CENTER | Age: 87
End: 2022-11-30

## 2022-11-30 ENCOUNTER — HOSPITAL ENCOUNTER (OUTPATIENT)
Dept: RADIATION ONCOLOGY | Facility: MEDICAL CENTER | Age: 87
End: 2022-11-30
Payer: MEDICARE

## 2022-11-30 LAB
CHEMOTHERAPY INFUSION START DATE: NORMAL
CHEMOTHERAPY INFUSION START DATE: NORMAL
CHEMOTHERAPY RECORDS: 1500
CHEMOTHERAPY RECORDS: 1500
CHEMOTHERAPY RECORDS: 3.75
CHEMOTHERAPY RECORDS: 3.75
CHEMOTHERAPY RECORDS: NORMAL
CHEMOTHERAPY RX CANCER: NORMAL
CHEMOTHERAPY RX CANCER: NORMAL
DATE 1ST CHEMO CANCER: NORMAL
DATE 1ST CHEMO CANCER: NORMAL
RAD ONC ARIA COURSE LAST TREATMENT DATE: NORMAL
RAD ONC ARIA COURSE LAST TREATMENT DATE: NORMAL
RAD ONC ARIA COURSE TREATMENT ELAPSED DAYS: NORMAL
RAD ONC ARIA COURSE TREATMENT ELAPSED DAYS: NORMAL
RAD ONC ARIA REFERENCE POINT DOSAGE GIVEN TO DATE: 18.75
RAD ONC ARIA REFERENCE POINT DOSAGE GIVEN TO DATE: 22.5
RAD ONC ARIA REFERENCE POINT DOSAGE GIVEN TO DATE: 3.85
RAD ONC ARIA REFERENCE POINT DOSAGE GIVEN TO DATE: 7.7
RAD ONC ARIA REFERENCE POINT ID: NORMAL
RAD ONC ARIA REFERENCE POINT SESSION DOSAGE GIVEN: 3.75
RAD ONC ARIA REFERENCE POINT SESSION DOSAGE GIVEN: 3.75
RAD ONC ARIA REFERENCE POINT SESSION DOSAGE GIVEN: 3.85
RAD ONC ARIA REFERENCE POINT SESSION DOSAGE GIVEN: 3.85

## 2022-11-30 PROCEDURE — 77386 HCHG IMRT DELIVERY COMPLEX: CPT | Performed by: RADIOLOGY

## 2022-11-30 PROCEDURE — 77280 THER RAD SIMULAJ FIELD SMPL: CPT | Performed by: RADIOLOGY

## 2022-11-30 PROCEDURE — 77014 PR CT GUIDANCE PLACEMENT RAD THERAPY FIELDS: CPT | Mod: 26,XE | Performed by: RADIOLOGY

## 2022-11-30 PROCEDURE — 77427 RADIATION TX MANAGEMENT X5: CPT | Performed by: RADIOLOGY

## 2022-11-30 NOTE — CT SIMULATION
DATE OF SERVICE: 11/30/2022    Radiation Therapy Episodes       Active Episodes       Radiation Therapy: IMRT (11/17/2022)                   Radiation Treatments         Plan Last Treated On Elapsed Days Fractions Treated Prescribed Fraction Dose (cGy) Prescribed Total Dose (cGy)    Lip_QS#1 11/17/2022 1 @ [id number] 4 of 4 375 1,500    Lip_QS#1 [Lip_QS#2] 11/30/2022 14 @ [id number] 1 of 4 375 1,500                  Reference Point Last Treated On Elapsed Days Most Recent Session Dose (cGy) Total Dose (cGy)    LipQS 11/30/2022 14 @ [id number] 375 1,875    LipQS#1_CP 11/17/2022 1 @ [id number] 389 1,556    LipQS#2_CP 11/30/2022 14 @ [id number] 385 385                            First Visit Simple Simulation: Called by [employer] machine to verify treatment parameters including:  treatment site, treatment dose, and treatment setup prior to first treatment. Image derived shifts reviewed in all appropriate plains.  Shifts approved.  Patient treated.    I have personally reviewed the relevant data, performed the target localization, and determined all relevant factors for this patient’s simulation.

## 2022-12-01 ENCOUNTER — HOSPITAL ENCOUNTER (OUTPATIENT)
Dept: RADIATION ONCOLOGY | Facility: MEDICAL CENTER | Age: 87
End: 2022-12-01

## 2022-12-01 ENCOUNTER — HOSPITAL ENCOUNTER (OUTPATIENT)
Dept: RADIATION ONCOLOGY | Facility: MEDICAL CENTER | Age: 87
End: 2022-12-31
Attending: RADIOLOGY
Payer: MEDICARE

## 2022-12-01 LAB
CHEMOTHERAPY INFUSION START DATE: NORMAL
CHEMOTHERAPY INFUSION START DATE: NORMAL
CHEMOTHERAPY RECORDS: 1500
CHEMOTHERAPY RECORDS: 1500
CHEMOTHERAPY RECORDS: 3.75
CHEMOTHERAPY RECORDS: 3.75
CHEMOTHERAPY RECORDS: NORMAL
CHEMOTHERAPY RX CANCER: NORMAL
CHEMOTHERAPY RX CANCER: NORMAL
DATE 1ST CHEMO CANCER: NORMAL
DATE 1ST CHEMO CANCER: NORMAL
RAD ONC ARIA COURSE LAST TREATMENT DATE: NORMAL
RAD ONC ARIA COURSE LAST TREATMENT DATE: NORMAL
RAD ONC ARIA COURSE TREATMENT ELAPSED DAYS: NORMAL
RAD ONC ARIA COURSE TREATMENT ELAPSED DAYS: NORMAL
RAD ONC ARIA REFERENCE POINT DOSAGE GIVEN TO DATE: 11.54
RAD ONC ARIA REFERENCE POINT DOSAGE GIVEN TO DATE: 15.39
RAD ONC ARIA REFERENCE POINT DOSAGE GIVEN TO DATE: 26.25
RAD ONC ARIA REFERENCE POINT DOSAGE GIVEN TO DATE: 30
RAD ONC ARIA REFERENCE POINT ID: NORMAL
RAD ONC ARIA REFERENCE POINT SESSION DOSAGE GIVEN: 3.75
RAD ONC ARIA REFERENCE POINT SESSION DOSAGE GIVEN: 3.75
RAD ONC ARIA REFERENCE POINT SESSION DOSAGE GIVEN: 3.85
RAD ONC ARIA REFERENCE POINT SESSION DOSAGE GIVEN: 3.85

## 2022-12-01 PROCEDURE — 77386 HCHG IMRT DELIVERY COMPLEX: CPT | Mod: XE | Performed by: RADIOLOGY

## 2022-12-01 PROCEDURE — 77336 RADIATION PHYSICS CONSULT: CPT | Performed by: RADIOLOGY

## 2022-12-01 PROCEDURE — 77014 PR CT GUIDANCE PLACEMENT RAD THERAPY FIELDS: CPT | Mod: 26,XE | Performed by: RADIOLOGY

## 2022-12-14 ENCOUNTER — HOSPITAL ENCOUNTER (OUTPATIENT)
Dept: RADIATION ONCOLOGY | Facility: MEDICAL CENTER | Age: 87
End: 2022-12-14

## 2022-12-14 DIAGNOSIS — C14.8 CANCER OF LIP AND ORAL CAVITY (HCC): ICD-10-CM

## 2022-12-14 PROCEDURE — 77290 THER RAD SIMULAJ FIELD CPLX: CPT | Performed by: RADIOLOGY

## 2022-12-14 NOTE — RADIATION PLANNING NOTES
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 on 10/5/2022  Histopathologic type: Squamous cell carcinoma, NOS  Stage prefix: Initial diagnosis  Histologic grade (G): G2  Histologic grading system: 3 grade system       DATE OF SERVICE: 12/14/2022    TYPE OF SIMULATION: Head & Neck    GOAL OF TREATMENT:   [] Curative  [x] Palliative  [] Oligometastatic    CONTRAST:    [] IV Contrast*  [] Oral Contrast               POSITION:    [x]  Supine  [] Prone     COMPLEX:  [] Complex Blocking   [x]Arcs  [] Custom Blocks  [] >3 Sites    PROCEDURE: Patient place in supine position on CT table with head in neutral position. Dentures removed. Shoulder pulls used to stabilize shoulders. Patient head and shoulders were immobilized with a thermoplastic mask.   films evaluated to ensure proper patient position.  CT obtained through entire volume of interest. Exam pushed to treatment planning system for contouring and planning.    I have personally reviewed the relevant data, performed the target localization, and determined all relevant factors for this patient’s simulation.    *Omnipaque 80 -100cc IVP in conjunction with 500cc NS

## 2022-12-14 NOTE — CT SIMULATION
PATIENT NAME Jennyfer L[place]hersage   PRIMARY PHYSICIAN Pcp Pt States None [id number]   REFERRING PHYSICIAN No ref. 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 on 10/5/2022  Histopathologic type: Squamous cell carcinoma, NOS  Stage prefix: Initial diagnosis  Histologic grade (G): G2  Histologic grading system: 3 grade system      Treatment Planning CT Simulation    No orders found for this encounter           PATIENT NAME Jennyfer Jordan   PRIMARY PHYSICIAN Pcp Pt States None [id number]   REFERRING PHYSICIAN No ref. 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 on 10/5/2022  Histopathologic type: Squamous cell carcinoma, NOS  Stage prefix: Initial diagnosis  Histologic grade (G): G2  Histologic grading system: 3 grade system         Treatment Planning CT Simulation        Order Questions       Question Answer Comment    Is this for a new course of treatment? No     Is this an Addendum? No     Implanted Device/Pacemaker No     Simulation Status Initial     Planned Start Date 12/16/2022     Treatment Technique IMRT     Treatment Pattern/Frequency Daily     Concurrent Chemotherapy Yes     CT Technique 3D     Slice Thickness 2mm     Scan Extent H&N     Contrast IV     IV Contrast Volume Other     Volume (cc) 100 @2.5    Treatment Device(s) S-Frame Mask      Shoulder Pulls     Patient Attire Gown     Patient Position Supine     Patient Orientation Head First     Arm Position Down     Dentures Out     Chin Position Neutral     Treatment Image Guidance CBCT     Frequency (CBCT) Daily     Image Guidance Match Bone     Treatment Planning Image Fusion CT/PET     Other Orders Weekly Physics Check                    Comments       Push to [facility]. Inform physician after pushed. Will review in [facility]

## 2022-12-15 PROCEDURE — 77300 RADIATION THERAPY DOSE PLAN: CPT | Mod: 26 | Performed by: RADIOLOGY

## 2022-12-15 PROCEDURE — 77301 RADIOTHERAPY DOSE PLAN IMRT: CPT | Performed by: RADIOLOGY

## 2022-12-15 PROCEDURE — 77300 RADIATION THERAPY DOSE PLAN: CPT | Performed by: RADIOLOGY

## 2022-12-15 PROCEDURE — 77338 DESIGN MLC DEVICE FOR IMRT: CPT | Performed by: RADIOLOGY

## 2022-12-15 PROCEDURE — 77301 RADIOTHERAPY DOSE PLAN IMRT: CPT | Mod: 26 | Performed by: RADIOLOGY

## 2022-12-15 PROCEDURE — 77338 DESIGN MLC DEVICE FOR IMRT: CPT | Mod: 26 | Performed by: RADIOLOGY

## 2022-12-16 NOTE — ADDENDUM NOTE
Encounter addended by: Mathew BABIN M.D. on: 12/16/2022 8:50 AM   Actions taken: Clinical Note Signed

## 2022-12-16 NOTE — RADIATION PLANNING NOTES
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 on 10/5/2022  Histopathologic type: Squamous cell carcinoma, NOS  Stage prefix: Initial diagnosis  Histologic grade (G): G2  Histologic grading system: 3 grade system       DATE OF SERVICE: 12/14/2022    TYPE OF SIMULATION: Head & Neck    GOAL OF TREATMENT:   [] Curative  [x] Palliative  [] Oligometastatic    CONTRAST:    [x] IV Contrast*  [] Oral Contrast               POSITION:    [x]  Supine  [] Prone     COMPLEX:  [] Complex Blocking   [x]Arcs  [] Custom Blocks  [] >3 Sites    PROCEDURE: Patient place in supine position on CT table with head in neutral position. Dentures removed. Shoulder pulls used to stabilize shoulders. Patient head and shoulders were immobilized with a thermoplastic mask.   films evaluated to ensure proper patient position.  CT obtained through entire volume of interest. Exam pushed to treatment planning system for contouring and planning.    I have personally reviewed the relevant data, performed the target localization, and determined all relevant factors for this patient’s simulation.    *Omnipaque 80 -100cc IVP in conjunction with 500cc NS

## 2022-12-27 ENCOUNTER — HOSPITAL ENCOUNTER (OUTPATIENT)
Dept: RADIATION ONCOLOGY | Facility: MEDICAL CENTER | Age: 87
End: 2022-12-27
Payer: MEDICARE

## 2022-12-27 LAB
CHEMOTHERAPY INFUSION START DATE: NORMAL
CHEMOTHERAPY INFUSION STOP DATE: NORMAL
CHEMOTHERAPY RECORDS: 1500
CHEMOTHERAPY RECORDS: 1500
CHEMOTHERAPY RECORDS: 3.75
CHEMOTHERAPY RECORDS: 3.75
CHEMOTHERAPY RECORDS: NORMAL
CHEMOTHERAPY RX CANCER: NORMAL
DATE 1ST CHEMO CANCER: NORMAL
RAD ONC ARIA COURSE LAST TREATMENT DATE: NORMAL
RAD ONC ARIA COURSE TREATMENT ELAPSED DAYS: NORMAL
RAD ONC ARIA REFERENCE POINT DOSAGE GIVEN TO DATE: 15.39
RAD ONC ARIA REFERENCE POINT DOSAGE GIVEN TO DATE: 15.56
RAD ONC ARIA REFERENCE POINT DOSAGE GIVEN TO DATE: 30
RAD ONC ARIA REFERENCE POINT ID: NORMAL

## 2023-01-03 NOTE — RADIATION COMPLETION NOTES
END OF TREATMENT SUMMARY    Patient name:  Jennyfer Jordan    Primary Physician:  Pcp Pt States None MRN: [id number]  CSN: [id number]   Referring physician:  Mario Morales M.D.  : 1931, 89 y.o.       TREATMENT SUMMARY:        Course First Treatment Date 2022    Course Last Treatment Date 2022   Course Elapsed Days 15 @    Course Intent Palliative     Radiation Therapy Episodes       Active Episodes       Radiation Therapy: IMRT (2022)                   Radiation Treatments       Historical Treatments (Plans: 2)      Plan Last Treated On Elapsed Days Fractions Treated Prescribed Fraction Dose (cGy) Prescribed Total Dose (cGy)   Lip_QS#1 2022 15 @  4 of 4 375 1,500   Lip_QS#2 2022 15 @  4 of 4 375 1,500                Reference Point Last Treated On Elapsed Days Most Recent Session Dose (cGy) Total Dose (cGy)   LipQS 2022 15 @  -- 3,000   LipQS#1_CP 2022 15 @  -- 1,556   LipQS#2_CP 2022 15 @  -- 1,539                                     STAGE:   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     CONCURRENT SYSTEMIC TREATMENT:   None     RT COURSE DISCONTINUED EARLY:   No     PATIENT EXPERIENCE:       2022   Toxicity Assessment   Toxicity Assessment Head/Neck   Fatigue (lethargy, malaise, asthenia) None   Radiation Dermatitis None   Rash/desquamation None   Constipation None   Dehydration None   Mouth Dryness Moderate   RT Dysphagia-Pharyngeal None   Mucositis None    Salivary Gland Changes None   Stomatitis/Pharyngitis None   Taste Disturbance (dysgeusia) Normal   RT - Pain due to RT None   Cough Absent   Voice changes/stridor/larynx (hoarseness, loss of voice, laryngitis) Normal       Multiple values from one day are sorted in reverse-chronological order        FOLLOW-UP PLAN:   As needed     COMMENT:   Patient and family decided to discontinue therapy       ANATOMIC TARGET SUMMARY    ANATOMIC TARGET MODALITY TECHNIQUE   Lip and neck   External beam, photons IMRT            COMMENT:         DIAGRAMS:      DOSE VOLUME HISTOGRAMS:          Mathew BABIN M.D.  Electronically signed by: Mathew BABIN M.D., 1/3/2023 12:39 PM  [phone]